# Patient Record
Sex: FEMALE | Race: ASIAN | NOT HISPANIC OR LATINO | URBAN - METROPOLITAN AREA
[De-identification: names, ages, dates, MRNs, and addresses within clinical notes are randomized per-mention and may not be internally consistent; named-entity substitution may affect disease eponyms.]

---

## 2023-11-18 ENCOUNTER — INPATIENT (INPATIENT)
Facility: HOSPITAL | Age: 28
LOS: 2 days | Discharge: HOME | End: 2023-11-21
Attending: OBSTETRICS & GYNECOLOGY | Admitting: OBSTETRICS & GYNECOLOGY
Payer: MEDICAID

## 2023-11-18 VITALS — HEART RATE: 81 BPM | SYSTOLIC BLOOD PRESSURE: 135 MMHG | DIASTOLIC BLOOD PRESSURE: 90 MMHG

## 2023-11-18 DIAGNOSIS — Z34.80 ENCOUNTER FOR SUPERVISION OF OTHER NORMAL PREGNANCY, UNSPECIFIED TRIMESTER: ICD-10-CM

## 2023-11-18 DIAGNOSIS — O26.899 OTHER SPECIFIED PREGNANCY RELATED CONDITIONS, UNSPECIFIED TRIMESTER: ICD-10-CM

## 2023-11-18 LAB
BASOPHILS # BLD AUTO: 0.03 K/UL — SIGNIFICANT CHANGE UP (ref 0–0.2)
BASOPHILS # BLD AUTO: 0.03 K/UL — SIGNIFICANT CHANGE UP (ref 0–0.2)
BASOPHILS NFR BLD AUTO: 0.3 % — SIGNIFICANT CHANGE UP (ref 0–2)
BASOPHILS NFR BLD AUTO: 0.3 % — SIGNIFICANT CHANGE UP (ref 0–2)
BLD GP AB SCN SERPL QL: NEGATIVE — SIGNIFICANT CHANGE UP
BLD GP AB SCN SERPL QL: NEGATIVE — SIGNIFICANT CHANGE UP
EOSINOPHIL # BLD AUTO: 0.3 K/UL — SIGNIFICANT CHANGE UP (ref 0–0.5)
EOSINOPHIL # BLD AUTO: 0.3 K/UL — SIGNIFICANT CHANGE UP (ref 0–0.5)
EOSINOPHIL NFR BLD AUTO: 2.9 % — SIGNIFICANT CHANGE UP (ref 0–6)
EOSINOPHIL NFR BLD AUTO: 2.9 % — SIGNIFICANT CHANGE UP (ref 0–6)
HCT VFR BLD CALC: 33.1 % — LOW (ref 34.5–45)
HCT VFR BLD CALC: 33.1 % — LOW (ref 34.5–45)
HGB BLD-MCNC: 10.3 G/DL — LOW (ref 11.5–15.5)
HGB BLD-MCNC: 10.3 G/DL — LOW (ref 11.5–15.5)
IMM GRANULOCYTES NFR BLD AUTO: 0.4 % — SIGNIFICANT CHANGE UP (ref 0–0.9)
IMM GRANULOCYTES NFR BLD AUTO: 0.4 % — SIGNIFICANT CHANGE UP (ref 0–0.9)
LYMPHOCYTES # BLD AUTO: 1.52 K/UL — SIGNIFICANT CHANGE UP (ref 1–3.3)
LYMPHOCYTES # BLD AUTO: 1.52 K/UL — SIGNIFICANT CHANGE UP (ref 1–3.3)
LYMPHOCYTES # BLD AUTO: 14.5 % — SIGNIFICANT CHANGE UP (ref 13–44)
LYMPHOCYTES # BLD AUTO: 14.5 % — SIGNIFICANT CHANGE UP (ref 13–44)
MCHC RBC-ENTMCNC: 24.9 PG — LOW (ref 27–34)
MCHC RBC-ENTMCNC: 24.9 PG — LOW (ref 27–34)
MCHC RBC-ENTMCNC: 31.1 GM/DL — LOW (ref 32–36)
MCHC RBC-ENTMCNC: 31.1 GM/DL — LOW (ref 32–36)
MCV RBC AUTO: 80 FL — SIGNIFICANT CHANGE UP (ref 80–100)
MCV RBC AUTO: 80 FL — SIGNIFICANT CHANGE UP (ref 80–100)
MONOCYTES # BLD AUTO: 0.55 K/UL — SIGNIFICANT CHANGE UP (ref 0–0.9)
MONOCYTES # BLD AUTO: 0.55 K/UL — SIGNIFICANT CHANGE UP (ref 0–0.9)
MONOCYTES NFR BLD AUTO: 5.3 % — SIGNIFICANT CHANGE UP (ref 2–14)
MONOCYTES NFR BLD AUTO: 5.3 % — SIGNIFICANT CHANGE UP (ref 2–14)
NEUTROPHILS # BLD AUTO: 8.01 K/UL — HIGH (ref 1.8–7.4)
NEUTROPHILS # BLD AUTO: 8.01 K/UL — HIGH (ref 1.8–7.4)
NEUTROPHILS NFR BLD AUTO: 76.6 % — SIGNIFICANT CHANGE UP (ref 43–77)
NEUTROPHILS NFR BLD AUTO: 76.6 % — SIGNIFICANT CHANGE UP (ref 43–77)
NRBC # BLD: 0 /100 WBCS — SIGNIFICANT CHANGE UP (ref 0–0)
NRBC # BLD: 0 /100 WBCS — SIGNIFICANT CHANGE UP (ref 0–0)
PLATELET # BLD AUTO: 258 K/UL — SIGNIFICANT CHANGE UP (ref 150–400)
PLATELET # BLD AUTO: 258 K/UL — SIGNIFICANT CHANGE UP (ref 150–400)
RBC # BLD: 4.14 M/UL — SIGNIFICANT CHANGE UP (ref 3.8–5.2)
RBC # BLD: 4.14 M/UL — SIGNIFICANT CHANGE UP (ref 3.8–5.2)
RBC # FLD: 15.6 % — HIGH (ref 10.3–14.5)
RBC # FLD: 15.6 % — HIGH (ref 10.3–14.5)
RH IG SCN BLD-IMP: POSITIVE — SIGNIFICANT CHANGE UP
RH IG SCN BLD-IMP: POSITIVE — SIGNIFICANT CHANGE UP
T PALLIDUM AB TITR SER: NEGATIVE — SIGNIFICANT CHANGE UP
T PALLIDUM AB TITR SER: NEGATIVE — SIGNIFICANT CHANGE UP
WBC # BLD: 10.45 K/UL — SIGNIFICANT CHANGE UP (ref 3.8–10.5)
WBC # BLD: 10.45 K/UL — SIGNIFICANT CHANGE UP (ref 3.8–10.5)
WBC # FLD AUTO: 10.45 K/UL — SIGNIFICANT CHANGE UP (ref 3.8–10.5)
WBC # FLD AUTO: 10.45 K/UL — SIGNIFICANT CHANGE UP (ref 3.8–10.5)

## 2023-11-18 RX ORDER — CITRIC ACID/SODIUM CITRATE 300-500 MG
15 SOLUTION, ORAL ORAL EVERY 6 HOURS
Refills: 0 | Status: DISCONTINUED | OUTPATIENT
Start: 2023-11-18 | End: 2023-11-19

## 2023-11-18 RX ORDER — ACETAMINOPHEN 500 MG
975 TABLET ORAL
Refills: 0 | Status: DISCONTINUED | OUTPATIENT
Start: 2023-11-18 | End: 2023-11-21

## 2023-11-18 RX ORDER — SIMETHICONE 80 MG/1
80 TABLET, CHEWABLE ORAL EVERY 4 HOURS
Refills: 0 | Status: DISCONTINUED | OUTPATIENT
Start: 2023-11-18 | End: 2023-11-21

## 2023-11-18 RX ORDER — MORPHINE SULFATE 50 MG/1
2 CAPSULE, EXTENDED RELEASE ORAL ONCE
Refills: 0 | Status: DISCONTINUED | OUTPATIENT
Start: 2023-11-18 | End: 2023-11-19

## 2023-11-18 RX ORDER — CHLORHEXIDINE GLUCONATE 213 G/1000ML
1 SOLUTION TOPICAL DAILY
Refills: 0 | Status: DISCONTINUED | OUTPATIENT
Start: 2023-11-18 | End: 2023-11-19

## 2023-11-18 RX ORDER — OXYCODONE HYDROCHLORIDE 5 MG/1
5 TABLET ORAL
Refills: 0 | Status: DISCONTINUED | OUTPATIENT
Start: 2023-11-18 | End: 2023-11-21

## 2023-11-18 RX ORDER — NALOXONE HYDROCHLORIDE 4 MG/.1ML
0.1 SPRAY NASAL
Refills: 0 | Status: DISCONTINUED | OUTPATIENT
Start: 2023-11-18 | End: 2023-11-19

## 2023-11-18 RX ORDER — LANOLIN
1 OINTMENT (GRAM) TOPICAL EVERY 6 HOURS
Refills: 0 | Status: DISCONTINUED | OUTPATIENT
Start: 2023-11-18 | End: 2023-11-21

## 2023-11-18 RX ORDER — OXYTOCIN 10 UNIT/ML
333.33 VIAL (ML) INJECTION
Qty: 20 | Refills: 0 | Status: DISCONTINUED | OUTPATIENT
Start: 2023-11-18 | End: 2023-11-21

## 2023-11-18 RX ORDER — IBUPROFEN 200 MG
600 TABLET ORAL EVERY 6 HOURS
Refills: 0 | Status: COMPLETED | OUTPATIENT
Start: 2023-11-18 | End: 2024-10-16

## 2023-11-18 RX ORDER — DIPHENHYDRAMINE HCL 50 MG
25 CAPSULE ORAL EVERY 6 HOURS
Refills: 0 | Status: DISCONTINUED | OUTPATIENT
Start: 2023-11-18 | End: 2023-11-21

## 2023-11-18 RX ORDER — OXYTOCIN 10 UNIT/ML
VIAL (ML) INJECTION
Qty: 30 | Refills: 0 | Status: DISCONTINUED | OUTPATIENT
Start: 2023-11-18 | End: 2023-11-19

## 2023-11-18 RX ORDER — OXYCODONE HYDROCHLORIDE 5 MG/1
5 TABLET ORAL ONCE
Refills: 0 | Status: DISCONTINUED | OUTPATIENT
Start: 2023-11-18 | End: 2023-11-21

## 2023-11-18 RX ORDER — SODIUM CHLORIDE 9 MG/ML
1000 INJECTION, SOLUTION INTRAVENOUS
Refills: 0 | Status: DISCONTINUED | OUTPATIENT
Start: 2023-11-18 | End: 2023-11-21

## 2023-11-18 RX ORDER — TETANUS TOXOID, REDUCED DIPHTHERIA TOXOID AND ACELLULAR PERTUSSIS VACCINE, ADSORBED 5; 2.5; 8; 8; 2.5 [IU]/.5ML; [IU]/.5ML; UG/.5ML; UG/.5ML; UG/.5ML
0.5 SUSPENSION INTRAMUSCULAR ONCE
Refills: 0 | Status: DISCONTINUED | OUTPATIENT
Start: 2023-11-18 | End: 2023-11-21

## 2023-11-18 RX ORDER — DEXAMETHASONE 0.5 MG/5ML
4 ELIXIR ORAL EVERY 6 HOURS
Refills: 0 | Status: DISCONTINUED | OUTPATIENT
Start: 2023-11-18 | End: 2023-11-19

## 2023-11-18 RX ORDER — SODIUM CHLORIDE 9 MG/ML
1000 INJECTION, SOLUTION INTRAVENOUS
Refills: 0 | Status: DISCONTINUED | OUTPATIENT
Start: 2023-11-18 | End: 2023-11-19

## 2023-11-18 RX ORDER — MAGNESIUM HYDROXIDE 400 MG/1
30 TABLET, CHEWABLE ORAL
Refills: 0 | Status: DISCONTINUED | OUTPATIENT
Start: 2023-11-18 | End: 2023-11-21

## 2023-11-18 RX ORDER — KETOROLAC TROMETHAMINE 30 MG/ML
30 SYRINGE (ML) INJECTION EVERY 6 HOURS
Refills: 0 | Status: DISCONTINUED | OUTPATIENT
Start: 2023-11-18 | End: 2023-11-19

## 2023-11-18 RX ORDER — ONDANSETRON 8 MG/1
4 TABLET, FILM COATED ORAL EVERY 6 HOURS
Refills: 0 | Status: DISCONTINUED | OUTPATIENT
Start: 2023-11-18 | End: 2023-11-19

## 2023-11-18 RX ADMIN — SODIUM CHLORIDE 125 MILLILITER(S): 9 INJECTION, SOLUTION INTRAVENOUS at 06:27

## 2023-11-18 RX ADMIN — Medication 2 MILLIUNIT(S)/MIN: at 10:33

## 2023-11-18 NOTE — OB PROVIDER H&P - NSLOWPPHRISK_OBGYN_A_OB
No previous uterine incision/Sagastume Pregnancy/Less than or equal to 4 previous vaginal births/No known bleeding disorder/No history of postpartum hemorrhage/No other PPH risks indicated

## 2023-11-18 NOTE — OB NEONATOLOGY/PEDIATRICIAN DELIVERY SUMMARY - NSPEDSNEONOTESA_OBGYN_ALL_OB_FT
Peds NP requested to attend delivery due to MSAF and Cat II NRFHT for this 41wk AGA male born on 23 at 2101 via primary unscheduled CS IOL due to postdate to a 26 y/o  blood type O+ mother.  No significant maternal or prenatal history.  PNL HIV -/Hep B-/RPR non-reactive/Rubella immune, GBS - on 10/17/23.  AROM at 1355 with light meconium fluids.  Baby emerged vigorous, crying, was warmed/ dried/ suctioned/ stimulated with APGARS of 9/9.  Mom plans to initiate breastfeeding, consents to Hep B vaccine, and consents to circ.  Highest maternal temp 37.5C with EOS of 0.32.  Admitted under Dr. Winkler.

## 2023-11-18 NOTE — OB PROVIDER LABOR PROGRESS NOTE - ASSESSMENT
Stable fetal status  Instructed in pushing  Reviewed poss FHRT changes in 2nd stage,  poss indication for intervention, pros/cons VAVD vs LFD vs C/S all reviewed.  Pt expressed understanding.  DIANE Sweet
Stable fetal status  Rebolus epidural  Cont labor
A/P: 28y/o  @41w IOL for LT  - Labor: 22@830a  - Fetus: cat 1  - GBS: negative  - Pain: minimal    Patient making good change, frida every 7-10 minutes. Will switch to pitocin.     Nay Chery, PGY1  d/w Dr. Sweet  
Stable fetal status  Clinically adeq pelvis  Clinical efw 8 lbs  AROM light mec  Plan cont Pit and monitor progress.  DIANE Sweet
Stable fetal status  Cont labor
A/P: 28y/o P0 @41w   - Labor: Pit@1030, paused   - Fetus: FHR decel -> return to category 1  - GBS: negative  - Pain: epidural, well controlled.      Patient with FHR deceleration seen and assessed at bedside. Patient found to have tonic contraction. Repositioned, pitocin paused. Deceleration resolved with repositioning and FHR stable category 1.     Nay Chery, PGY1    d/w Dr. Sweet    
A/P: 28y/o  @41w IOL for LT  Pitocin off  If tracing okay in next 20-30 min will restart Pitocin  Dr Sweet aware  Stephanie Aguilar PAC

## 2023-11-18 NOTE — OB PROVIDER DELIVERY SUMMARY - NSSELHIDDEN_OBGYN_ALL_OB_FT
[NS_DeliveryAttending1_OBGYN_ALL_OB_FT:[id number]=],[NS_DeliveryRN_OBGYN_ALL_OB_FT:[id number]=]

## 2023-11-18 NOTE — OB PROVIDER LABOR PROGRESS NOTE - NSVAGINALEXAM_OBGYN_ALL_OB_DT
18-Nov-2023 15:31
18-Nov-2023 18:22
18-Nov-2023 17:20
18-Nov-2023 12:22
18-Nov-2023 16:20
18-Nov-2023 10:02
18-Nov-2023 13:54
Statement Selected

## 2023-11-18 NOTE — OB RN PATIENT PROFILE - FALL HARM RISK - UNIVERSAL INTERVENTIONS
Bed in lowest position, wheels locked, appropriate side rails in place/Call bell, personal items and telephone in reach/Instruct patient to call for assistance before getting out of bed or chair/Non-slip footwear when patient is out of bed/[place] to call system/Physically safe environment - no spills, clutter or unnecessary equipment/Purposeful Proactive Rounding/Room/bathroom lighting operational, light cord in reach

## 2023-11-18 NOTE — OB RN DELIVERY SUMMARY - NS_SEPSISRSKCALC_OBGYN_ALL_OB_FT
EOS calculated successfully. EOS Risk Factor: 0.5/1000 live births ([facility] national incidence); GA=41w;Temp=99.5; ROM=7.1; GBS='Negative'; Antibiotics='Broad spectrum antibiotics 2-3.9 hrs prior to birth'

## 2023-11-18 NOTE — PRE-ANESTHESIA EVALUATION ADULT - NSANTHOSAYNRD_GEN_A_CORE
No. ALIN screening performed.  STOP BANG Legend: 0-2 = LOW Risk; 3-4 = INTERMEDIATE Risk; 5-8 = HIGH Risk

## 2023-11-18 NOTE — OB RN DELIVERY SUMMARY - NS_LABORCHARACTER_OBGYN_ALL_OB
Induction of labor-AROM/Induction of labor-Medicinal/Internal electronic FM/External electronic FM/Meconium staining/Fetal intolerance

## 2023-11-18 NOTE — OB RN INTRAOPERATIVE NOTE - NSSELHIDDEN_OBGYN_ALL_OB_FT
[NS_DeliveryAttending1_OBGYN_ALL_OB_FT:[id number]=],[NS_DeliveryRN_OBGYN_ALL_OB_FT:[id number]=]

## 2023-11-18 NOTE — OB PROVIDER LABOR PROGRESS NOTE - NS_SUBJECTIVE/OBJECTIVE_OBGYN_ALL_OB_FT
Att  28 yo P0 at 41wks, adm early this am in early labor after having ctx's all yest afternoon and evening.  Uncomplicated APC, no sig PMHs, GBS neg.  Was started on oral cytotec when ctx's spaced out, then switched to Pit, now comfortable with epidural.
Evaluated again during a push.  Vtx will descend further, feels to be ROT to MURALI.  Pros/cons attempting LFD reviewed and I would recommend proceed to C/S and pt agrees.  Staff will open an OR.  DIANE Sweet
Pt started pushing and decel noted.  Good recovery.  FSE applied.  Will monitor for recovery and then resume pushing.  DIANE Sweet
Pushing with pt.   She is pushing well.  Caput at +4, vtx at +3.  D/w pt, worsening decels on FHRT, will recommend intervention if they persist.  If vtx descends further will try LFD, if not will proceed to C/S.  DIANE Sweet
Resumed pushing around 715pm (due to nursing change of shift)  Pushing well now for about 30m.  Vtx remains at +3 station.  FHRT showed variables decels.  Encouraged pt to cont pushing.  If vtx descends further and FHRT cont to show decels will eval for LFD.  DIANE Sweet
Earlier had a decel, recovered well, Pit was shut off, now back on at 4 mu/min.  Pt now more uncomfortable.
R1 OB Labor Note    S: Patient seen and examined at bedside.     T(C): 36.8 (11-18-23 @ 08:49), Max: 36.8 (11-18-23 @ 04:19)  HR: 84 (11-18-23 @ 09:55) (71 - 99)  BP: 120/78 (11-18-23 @ 08:49) (117/73 - 136/89)  BP(mean): --  ABP: --  ABP(mean): --  RR: 16 (11-18-23 @ 08:49) (16 - 18)  SpO2: 96% (11-18-23 @ 09:55) (93% - 98%)  Wt(kg): --  CVP(mm Hg): --  CI: --  CAPILLARY BLOOD GLUCOSE       N/A      11-18 @ 07:01  -  11-18 @ 10:01  --------------------------------------------------------  IN:  Total IN: 0 mL    OUT:    Voided (mL): 200 mL  Total OUT: 200 mL    Total NET: -200 mL
Pt seen for 2 minute decel. Pt feeling well.  T(C): 36.8 (11-18-23 @ 10:40), Max: 36.8 (11-18-23 @ 04:19)  HR: 79 (11-18-23 @ 12:20) (71 - 112)  BP: 109/72 (11-18-23 @ 12:20) (102/67 - 136/89)  RR: 16 (11-18-23 @ 10:40) (16 - 18)  SpO2: 94% (11-18-23 @ 12:20) (93% - 98%)
Comfortable, feels rectal pressure
Pain better, feeling pressure
R1 OB Labor Note    S: Patient seen and examined at bedside.     T(C): 36.8 (11-18-23 @ 15:13), Max: 36.8 (11-18-23 @ 04:19)  HR: 90 (11-18-23 @ 15:29) (71 - 112)  BP: 120/81 (11-18-23 @ 15:20) (102/67 - 136/89)  BP(mean): --  ABP: --  ABP(mean): --  RR: 18 (11-18-23 @ 15:13) (16 - 18)  SpO2: 96% (11-18-23 @ 15:29) (92% - 98%)  Wt(kg): --  CVP(mm Hg): --  CI: --  CAPILLARY BLOOD GLUCOSE       N/A      11-18 @ 07:01  -  11-18 @ 15:31  --------------------------------------------------------  IN:    dextrose 5% + lactated ringers: 1000 mL  Total IN: 1000 mL    OUT:    Voided (mL): 500 mL  Total OUT: 500 mL    Total NET: 500 mL

## 2023-11-18 NOTE — OB RN PATIENT PROFILE - HISTORY OF COVID-19 VACCINATION
"Nina Samuelaustin Rodriguez was seen and treated in our emergency department on 10/8/2023.  She should be cleared by a physician before returning to gym class or sports on 10/23/2023.  Can not participate in any physical activities where reaching over head or use of the upper extremities is needed.     If you have any questions or concerns, please don't hesitate to call.      Isra Mack MD"
Yes

## 2023-11-18 NOTE — OB PROVIDER H&P - NSHPPHYSICALEXAM_GEN_ALL_CORE
CV: S1,S2  Pulmonary: Clear to auscultation bilaterally    Abdomen: Gravid abdomen  Extremities: Nontender to palpation bilaterally     EFM: 120 hr, moderate variability, +accelerations, -decelerations   TOCO: Contractions [id number]   SVE: 1/50/-3  BSUS: Vertex

## 2023-11-18 NOTE — OB PROVIDER DELIVERY SUMMARY - NSPROVIDERDELIVERYNOTE_OBGYN_ALL_OB_FT
Primary LTCS, uncomplicated for arrest of descent at fully dilated. Fetus found to be ROT.  Viable male infant, vertex presentation, Apgars 9/9, cord gasses sent  Grossly normal fallopian tubes, uterus, and ovaries  Uterus closed in 1 locked running layer with PDS  Muscle reapproximated with 1 vicryl  Fascia closed with 0 vicryl  Skin closed with insorb    EBL: 868  IVF: 1500  UOP: 200    Dr. Jiang () assisted with upward manual elevation of fetal head.    NOEL Boudreaux, PGY3  w/ Dr. Sweet Primary LTCS, uncomplicated for arrest of descent at fully dilated. Fetus found to be ROT.  Viable male infant, vertex presentation, Apgars 9/9, cord gasses sent  Grossly normal fallopian tubes, uterus, and ovaries  Uterus closed in 1 locked running layer with PDS  Muscle reapproximated with 1 vicryl  Fascia closed with 0 vicryl  Skin closed with insorb    EBL: 868  IVF: 1500  UOP: 200  Dictation#[id number]    Dr. Jiang () assisted with upward manual elevation of fetal head.    NOEL Boudreaux, PGY3  w/ Dr. Sweet Primary LTCS, uncomplicated for arrest of descent and NRFHRT at fully dilated. Fetus found to be ROT.  Viable male infant, vertex presentation, Apgars 9/9, cord gasses sent  Grossly normal fallopian tubes, uterus, and ovaries  Uterus closed in 1 locked running layer with PDS  Muscle reapproximated with 0 vicryl  Fascia closed with 1 vicryl  Skin closed with insorb    EBL: 868  IVF: 1500  UOP: 200  Dictation#[id number]    Dr. Jiang () assisted with upward manual elevation of fetal head.    NOEL Boudreaux, PGY3  w/ Dr. Sweet

## 2023-11-18 NOTE — OB PROVIDER H&P - HISTORY OF PRESENT ILLNESS
26 yo  @41 weeks (ABHI: 23) presents with painful contractions q4 minutes. Patient admits to good fetal movement, and denies vaginal bleeding or loss of fluids at this time.    GBS: Negative  EFW: 3200  OB: Primigravida   GYN: Denies history of fibroids, abnormal pap smears, STDs, or ovarian cysts   Allergies: NKDA   Medical Hx: Denies history of HTN, DM, bleeding disorders, thyroid disorders   Medications: Prenatal vitamins   Surgical Hx: Denies   Social Hx: Denies x3, no anxiety or depression

## 2023-11-18 NOTE — OB PROVIDER H&P - ASSESSMENT
A/P: 28 yo  @41 weeks (ABHI:23) admitted in labor.   Plan   - Admit to L&D  - Routine labs, IVF, NPO  - EFM: 120hr, moderate variability, +accelerations, -decelerations   - GBS: Negative  - EFW: 3200  - Expectant management  - Epidural PRN  - Discussed with Dr. Cuevas

## 2023-11-18 NOTE — OB PROVIDER IHI INDUCTION/AUGMENTATION NOTE - NS_OBIHIADDITIONDETAILS_OBGYN_ALL_OB_FT
26 yo  @41 weeks (ABHI:23) admitted in labor.   Plan:  PO cytotec  Anticipate  26 yo  @41 weeks (ABHI:23) admitted in labor.   Plan:  -Continue to monitor EFM/ TOCO   -PO cytotec  Anticipate

## 2023-11-18 NOTE — OB PROVIDER LABOR PROGRESS NOTE - NS_OBIHIFHRDETAILS_OBGYN_ALL_OB_FT
140 + accels occ early type decels mod variab
140 + accels no decels mod variab
120/mod/+accels/2 min prolonged decel to davon 70bpm->recovered with Pitocin off and repositioning, cat 2 FHT
120/mod/+accels/-decels
140 + accels no decels mod variab
5 minute deceleration -> return to baseline at 120/mod/+accels/-decels
140 + accels no decels mod variab

## 2023-11-18 NOTE — OB RN DELIVERY SUMMARY - NSSELHIDDEN_OBGYN_ALL_OB_FT
[NS_DeliveryAttending1_OBGYN_ALL_OB_FT:[id number]=],[NS_DeliveryRN_OBGYN_ALL_OB_FT:[id number]=]

## 2023-11-18 NOTE — OB PROVIDER H&P - ANESTHESIA, PREVIOUS REACTION, PROFILE
none

## 2023-11-18 NOTE — OB PROVIDER H&P - NSTRANFUSIONOBJECTION_GEN_ALL_CORE_SIUH
February 5, 2019      Patience Norton MD  [address]  [address]  [facility]  [facility] [address]           [facility]  [address] [address] [place] [address]  Phone: [phone]  Fax: [phone]          Patient: Cee Mora   MR Number: [id number]   YOB: 1963   Date of Visit: 2/5/2019       Dear Dr. Patience Norton:    Thank you for referring Cee Mora to me for evaluation. Attached you will find relevant portions of my assessment and plan of care.    If you have questions, please do not hesitate to call me. I look forward to following Cee Mora along with you.    Sincerely,    Desean Suero MD    Enclosure  CC:  No Recipients    If you would like to receive this communication electronically, please contact [email or web address] or [phone] to request more information on [employer] access.    For providers and/or their staff who would like to refer a patient to [facility], please contact us through our one-stop-shop provider referral line, [facility], at [phone].    If you feel you have received this communication in error or would no longer like to receive these types of communications, please e-mail [email or web address]          Patient has no objection to blood transfusions.

## 2023-11-19 DIAGNOSIS — D62 ACUTE POSTHEMORRHAGIC ANEMIA: ICD-10-CM

## 2023-11-19 LAB
BASOPHILS # BLD AUTO: 0.02 K/UL — SIGNIFICANT CHANGE UP (ref 0–0.2)
BASOPHILS # BLD AUTO: 0.02 K/UL — SIGNIFICANT CHANGE UP (ref 0–0.2)
BASOPHILS NFR BLD AUTO: 0.1 % — SIGNIFICANT CHANGE UP (ref 0–2)
BASOPHILS NFR BLD AUTO: 0.1 % — SIGNIFICANT CHANGE UP (ref 0–2)
EOSINOPHIL # BLD AUTO: 0 K/UL — SIGNIFICANT CHANGE UP (ref 0–0.5)
EOSINOPHIL # BLD AUTO: 0 K/UL — SIGNIFICANT CHANGE UP (ref 0–0.5)
EOSINOPHIL NFR BLD AUTO: 0 % — SIGNIFICANT CHANGE UP (ref 0–6)
EOSINOPHIL NFR BLD AUTO: 0 % — SIGNIFICANT CHANGE UP (ref 0–6)
HCT VFR BLD CALC: 23.6 % — LOW (ref 34.5–45)
HCT VFR BLD CALC: 23.6 % — LOW (ref 34.5–45)
HCT VFR BLD CALC: 25.8 % — LOW (ref 34.5–45)
HCT VFR BLD CALC: 25.8 % — LOW (ref 34.5–45)
HGB BLD-MCNC: 7.4 G/DL — LOW (ref 11.5–15.5)
HGB BLD-MCNC: 7.4 G/DL — LOW (ref 11.5–15.5)
HGB BLD-MCNC: 8.2 G/DL — LOW (ref 11.5–15.5)
HGB BLD-MCNC: 8.2 G/DL — LOW (ref 11.5–15.5)
IMM GRANULOCYTES NFR BLD AUTO: 0.7 % — SIGNIFICANT CHANGE UP (ref 0–0.9)
IMM GRANULOCYTES NFR BLD AUTO: 0.7 % — SIGNIFICANT CHANGE UP (ref 0–0.9)
LYMPHOCYTES # BLD AUTO: 0.41 K/UL — LOW (ref 1–3.3)
LYMPHOCYTES # BLD AUTO: 0.41 K/UL — LOW (ref 1–3.3)
LYMPHOCYTES # BLD AUTO: 2 % — LOW (ref 13–44)
LYMPHOCYTES # BLD AUTO: 2 % — LOW (ref 13–44)
MCHC RBC-ENTMCNC: 24.9 PG — LOW (ref 27–34)
MCHC RBC-ENTMCNC: 24.9 PG — LOW (ref 27–34)
MCHC RBC-ENTMCNC: 25.2 PG — LOW (ref 27–34)
MCHC RBC-ENTMCNC: 25.2 PG — LOW (ref 27–34)
MCHC RBC-ENTMCNC: 31.4 GM/DL — LOW (ref 32–36)
MCHC RBC-ENTMCNC: 31.4 GM/DL — LOW (ref 32–36)
MCHC RBC-ENTMCNC: 31.8 GM/DL — LOW (ref 32–36)
MCHC RBC-ENTMCNC: 31.8 GM/DL — LOW (ref 32–36)
MCV RBC AUTO: 79.1 FL — LOW (ref 80–100)
MCV RBC AUTO: 79.1 FL — LOW (ref 80–100)
MCV RBC AUTO: 79.5 FL — LOW (ref 80–100)
MCV RBC AUTO: 79.5 FL — LOW (ref 80–100)
MONOCYTES # BLD AUTO: 0.56 K/UL — SIGNIFICANT CHANGE UP (ref 0–0.9)
MONOCYTES # BLD AUTO: 0.56 K/UL — SIGNIFICANT CHANGE UP (ref 0–0.9)
MONOCYTES NFR BLD AUTO: 2.7 % — SIGNIFICANT CHANGE UP (ref 2–14)
MONOCYTES NFR BLD AUTO: 2.7 % — SIGNIFICANT CHANGE UP (ref 2–14)
NEUTROPHILS # BLD AUTO: 19.63 K/UL — HIGH (ref 1.8–7.4)
NEUTROPHILS # BLD AUTO: 19.63 K/UL — HIGH (ref 1.8–7.4)
NEUTROPHILS NFR BLD AUTO: 94.5 % — HIGH (ref 43–77)
NEUTROPHILS NFR BLD AUTO: 94.5 % — HIGH (ref 43–77)
NRBC # BLD: 0 /100 WBCS — SIGNIFICANT CHANGE UP (ref 0–0)
PLATELET # BLD AUTO: 212 K/UL — SIGNIFICANT CHANGE UP (ref 150–400)
PLATELET # BLD AUTO: 212 K/UL — SIGNIFICANT CHANGE UP (ref 150–400)
PLATELET # BLD AUTO: 224 K/UL — SIGNIFICANT CHANGE UP (ref 150–400)
PLATELET # BLD AUTO: 224 K/UL — SIGNIFICANT CHANGE UP (ref 150–400)
RBC # BLD: 2.97 M/UL — LOW (ref 3.8–5.2)
RBC # BLD: 2.97 M/UL — LOW (ref 3.8–5.2)
RBC # BLD: 3.26 M/UL — LOW (ref 3.8–5.2)
RBC # BLD: 3.26 M/UL — LOW (ref 3.8–5.2)
RBC # FLD: 15.7 % — HIGH (ref 10.3–14.5)
RBC # FLD: 15.7 % — HIGH (ref 10.3–14.5)
RBC # FLD: 15.8 % — HIGH (ref 10.3–14.5)
RBC # FLD: 15.8 % — HIGH (ref 10.3–14.5)
WBC # BLD: 17.94 K/UL — HIGH (ref 3.8–10.5)
WBC # BLD: 17.94 K/UL — HIGH (ref 3.8–10.5)
WBC # BLD: 20.76 K/UL — HIGH (ref 3.8–10.5)
WBC # BLD: 20.76 K/UL — HIGH (ref 3.8–10.5)
WBC # FLD AUTO: 17.94 K/UL — HIGH (ref 3.8–10.5)
WBC # FLD AUTO: 17.94 K/UL — HIGH (ref 3.8–10.5)
WBC # FLD AUTO: 20.76 K/UL — HIGH (ref 3.8–10.5)
WBC # FLD AUTO: 20.76 K/UL — HIGH (ref 3.8–10.5)

## 2023-11-19 RX ORDER — ASCORBIC ACID 60 MG
500 TABLET,CHEWABLE ORAL THREE TIMES A DAY
Refills: 0 | Status: DISCONTINUED | OUTPATIENT
Start: 2023-11-19 | End: 2023-11-21

## 2023-11-19 RX ORDER — IBUPROFEN 200 MG
600 TABLET ORAL EVERY 6 HOURS
Refills: 0 | Status: DISCONTINUED | OUTPATIENT
Start: 2023-11-19 | End: 2023-11-21

## 2023-11-19 RX ORDER — FERROUS SULFATE 325(65) MG
325 TABLET ORAL THREE TIMES A DAY
Refills: 0 | Status: DISCONTINUED | OUTPATIENT
Start: 2023-11-19 | End: 2023-11-21

## 2023-11-19 RX ORDER — HEPARIN SODIUM 5000 [USP'U]/ML
5000 INJECTION INTRAVENOUS; SUBCUTANEOUS EVERY 12 HOURS
Refills: 0 | Status: DISCONTINUED | OUTPATIENT
Start: 2023-11-19 | End: 2023-11-21

## 2023-11-19 RX ADMIN — Medication 4 MILLIGRAM(S): at 03:19

## 2023-11-19 RX ADMIN — Medication 30 MILLIGRAM(S): at 22:45

## 2023-11-19 RX ADMIN — Medication 30 MILLIGRAM(S): at 16:02

## 2023-11-19 RX ADMIN — HEPARIN SODIUM 5000 UNIT(S): 5000 INJECTION INTRAVENOUS; SUBCUTANEOUS at 18:10

## 2023-11-19 RX ADMIN — Medication 30 MILLIGRAM(S): at 21:47

## 2023-11-19 RX ADMIN — Medication 975 MILLIGRAM(S): at 18:10

## 2023-11-19 RX ADMIN — Medication 30 MILLIGRAM(S): at 09:25

## 2023-11-19 RX ADMIN — Medication 30 MILLIGRAM(S): at 02:44

## 2023-11-19 RX ADMIN — Medication 30 MILLIGRAM(S): at 15:21

## 2023-11-19 RX ADMIN — Medication 500 MILLIGRAM(S): at 15:21

## 2023-11-19 RX ADMIN — Medication 500 MILLIGRAM(S): at 21:44

## 2023-11-19 RX ADMIN — Medication 975 MILLIGRAM(S): at 13:05

## 2023-11-19 RX ADMIN — HEPARIN SODIUM 5000 UNIT(S): 5000 INJECTION INTRAVENOUS; SUBCUTANEOUS at 06:23

## 2023-11-19 RX ADMIN — Medication 30 MILLIGRAM(S): at 03:20

## 2023-11-19 RX ADMIN — Medication 325 MILLIGRAM(S): at 15:21

## 2023-11-19 RX ADMIN — Medication 975 MILLIGRAM(S): at 06:23

## 2023-11-19 RX ADMIN — Medication 325 MILLIGRAM(S): at 21:44

## 2023-11-19 RX ADMIN — Medication 975 MILLIGRAM(S): at 12:09

## 2023-11-19 RX ADMIN — Medication 30 MILLIGRAM(S): at 10:25

## 2023-11-19 NOTE — PROGRESS NOTE ADULT - ATTENDING COMMENTS
Resting comfortably, still has Block  VSS afeb  Abd S NT ND FF lochia moderate  inc c/d/i  Stable fetal status  Rpt CBC  Incr OOB  DTV  G Sweet

## 2023-11-20 LAB
HCT VFR BLD CALC: 22 % — LOW (ref 34.5–45)
HCT VFR BLD CALC: 22 % — LOW (ref 34.5–45)
HCT VFR BLD CALC: 25.2 % — LOW (ref 34.5–45)
HCT VFR BLD CALC: 25.2 % — LOW (ref 34.5–45)
HGB BLD-MCNC: 6.9 G/DL — CRITICAL LOW (ref 11.5–15.5)
HGB BLD-MCNC: 6.9 G/DL — CRITICAL LOW (ref 11.5–15.5)
HGB BLD-MCNC: 8.2 G/DL — LOW (ref 11.5–15.5)
HGB BLD-MCNC: 8.2 G/DL — LOW (ref 11.5–15.5)
MCHC RBC-ENTMCNC: 25 PG — LOW (ref 27–34)
MCHC RBC-ENTMCNC: 25 PG — LOW (ref 27–34)
MCHC RBC-ENTMCNC: 25.9 PG — LOW (ref 27–34)
MCHC RBC-ENTMCNC: 25.9 PG — LOW (ref 27–34)
MCHC RBC-ENTMCNC: 31.4 GM/DL — LOW (ref 32–36)
MCHC RBC-ENTMCNC: 31.4 GM/DL — LOW (ref 32–36)
MCHC RBC-ENTMCNC: 32.5 GM/DL — SIGNIFICANT CHANGE UP (ref 32–36)
MCHC RBC-ENTMCNC: 32.5 GM/DL — SIGNIFICANT CHANGE UP (ref 32–36)
MCV RBC AUTO: 79.7 FL — LOW (ref 80–100)
NRBC # BLD: 0 /100 WBCS — SIGNIFICANT CHANGE UP (ref 0–0)
PLATELET # BLD AUTO: 219 K/UL — SIGNIFICANT CHANGE UP (ref 150–400)
PLATELET # BLD AUTO: 219 K/UL — SIGNIFICANT CHANGE UP (ref 150–400)
PLATELET # BLD AUTO: 248 K/UL — SIGNIFICANT CHANGE UP (ref 150–400)
PLATELET # BLD AUTO: 248 K/UL — SIGNIFICANT CHANGE UP (ref 150–400)
RBC # BLD: 2.76 M/UL — LOW (ref 3.8–5.2)
RBC # BLD: 2.76 M/UL — LOW (ref 3.8–5.2)
RBC # BLD: 3.16 M/UL — LOW (ref 3.8–5.2)
RBC # BLD: 3.16 M/UL — LOW (ref 3.8–5.2)
RBC # FLD: 15.9 % — HIGH (ref 10.3–14.5)
RBC # FLD: 15.9 % — HIGH (ref 10.3–14.5)
RBC # FLD: 16 % — HIGH (ref 10.3–14.5)
RBC # FLD: 16 % — HIGH (ref 10.3–14.5)
WBC # BLD: 14.09 K/UL — HIGH (ref 3.8–10.5)
WBC # BLD: 14.09 K/UL — HIGH (ref 3.8–10.5)
WBC # BLD: 14.37 K/UL — HIGH (ref 3.8–10.5)
WBC # BLD: 14.37 K/UL — HIGH (ref 3.8–10.5)
WBC # FLD AUTO: 14.09 K/UL — HIGH (ref 3.8–10.5)
WBC # FLD AUTO: 14.09 K/UL — HIGH (ref 3.8–10.5)
WBC # FLD AUTO: 14.37 K/UL — HIGH (ref 3.8–10.5)
WBC # FLD AUTO: 14.37 K/UL — HIGH (ref 3.8–10.5)

## 2023-11-20 RX ORDER — DIPHENHYDRAMINE HCL 50 MG
25 CAPSULE ORAL ONCE
Refills: 0 | Status: COMPLETED | OUTPATIENT
Start: 2023-11-20 | End: 2023-11-20

## 2023-11-20 RX ADMIN — Medication 600 MILLIGRAM(S): at 08:59

## 2023-11-20 RX ADMIN — Medication 25 MILLIGRAM(S): at 09:53

## 2023-11-20 RX ADMIN — Medication 975 MILLIGRAM(S): at 00:06

## 2023-11-20 RX ADMIN — Medication 975 MILLIGRAM(S): at 13:09

## 2023-11-20 RX ADMIN — Medication 975 MILLIGRAM(S): at 06:23

## 2023-11-20 RX ADMIN — Medication 600 MILLIGRAM(S): at 22:30

## 2023-11-20 RX ADMIN — Medication 975 MILLIGRAM(S): at 13:30

## 2023-11-20 RX ADMIN — Medication 975 MILLIGRAM(S): at 07:00

## 2023-11-20 RX ADMIN — HEPARIN SODIUM 5000 UNIT(S): 5000 INJECTION INTRAVENOUS; SUBCUTANEOUS at 06:25

## 2023-11-20 RX ADMIN — Medication 975 MILLIGRAM(S): at 23:51

## 2023-11-20 RX ADMIN — Medication 325 MILLIGRAM(S): at 06:24

## 2023-11-20 RX ADMIN — MAGNESIUM HYDROXIDE 30 MILLILITER(S): 400 TABLET, CHEWABLE ORAL at 18:02

## 2023-11-20 RX ADMIN — Medication 975 MILLIGRAM(S): at 18:34

## 2023-11-20 RX ADMIN — HEPARIN SODIUM 5000 UNIT(S): 5000 INJECTION INTRAVENOUS; SUBCUTANEOUS at 18:05

## 2023-11-20 RX ADMIN — Medication 500 MILLIGRAM(S): at 13:09

## 2023-11-20 RX ADMIN — Medication 600 MILLIGRAM(S): at 16:30

## 2023-11-20 RX ADMIN — Medication 325 MILLIGRAM(S): at 21:31

## 2023-11-20 RX ADMIN — Medication 600 MILLIGRAM(S): at 21:30

## 2023-11-20 RX ADMIN — Medication 500 MILLIGRAM(S): at 21:31

## 2023-11-20 RX ADMIN — Medication 975 MILLIGRAM(S): at 01:00

## 2023-11-20 RX ADMIN — Medication 600 MILLIGRAM(S): at 09:30

## 2023-11-20 RX ADMIN — Medication 975 MILLIGRAM(S): at 18:03

## 2023-11-20 RX ADMIN — Medication 500 MILLIGRAM(S): at 06:24

## 2023-11-20 RX ADMIN — Medication 600 MILLIGRAM(S): at 16:03

## 2023-11-20 RX ADMIN — Medication 325 MILLIGRAM(S): at 13:09

## 2023-11-20 NOTE — PROGRESS NOTE ADULT - ATTENDING COMMENTS
Patient seen and examined by me.   Denies CP/SOB/palpitations/dizziness, however appears pale  lochia wnl  h/h 6.9/22  recommend one unit prbcs, pt and  agree, cbc post transfusion and ramiro morning

## 2023-11-21 VITALS
HEART RATE: 82 BPM | TEMPERATURE: 98 F | OXYGEN SATURATION: 99 % | DIASTOLIC BLOOD PRESSURE: 85 MMHG | RESPIRATION RATE: 18 BRPM | SYSTOLIC BLOOD PRESSURE: 128 MMHG

## 2023-11-21 LAB
HCT VFR BLD CALC: 27.2 % — LOW (ref 34.5–45)
HCT VFR BLD CALC: 27.2 % — LOW (ref 34.5–45)
HGB BLD-MCNC: 8.8 G/DL — LOW (ref 11.5–15.5)
HGB BLD-MCNC: 8.8 G/DL — LOW (ref 11.5–15.5)
MCHC RBC-ENTMCNC: 26 PG — LOW (ref 27–34)
MCHC RBC-ENTMCNC: 26 PG — LOW (ref 27–34)
MCHC RBC-ENTMCNC: 32.4 GM/DL — SIGNIFICANT CHANGE UP (ref 32–36)
MCHC RBC-ENTMCNC: 32.4 GM/DL — SIGNIFICANT CHANGE UP (ref 32–36)
MCV RBC AUTO: 80.2 FL — SIGNIFICANT CHANGE UP (ref 80–100)
MCV RBC AUTO: 80.2 FL — SIGNIFICANT CHANGE UP (ref 80–100)
NRBC # BLD: 0 /100 WBCS — SIGNIFICANT CHANGE UP (ref 0–0)
NRBC # BLD: 0 /100 WBCS — SIGNIFICANT CHANGE UP (ref 0–0)
PLATELET # BLD AUTO: 262 K/UL — SIGNIFICANT CHANGE UP (ref 150–400)
PLATELET # BLD AUTO: 262 K/UL — SIGNIFICANT CHANGE UP (ref 150–400)
RBC # BLD: 3.39 M/UL — LOW (ref 3.8–5.2)
RBC # BLD: 3.39 M/UL — LOW (ref 3.8–5.2)
RBC # FLD: 16 % — HIGH (ref 10.3–14.5)
RBC # FLD: 16 % — HIGH (ref 10.3–14.5)
WBC # BLD: 12.5 K/UL — HIGH (ref 3.8–10.5)
WBC # BLD: 12.5 K/UL — HIGH (ref 3.8–10.5)
WBC # FLD AUTO: 12.5 K/UL — HIGH (ref 3.8–10.5)
WBC # FLD AUTO: 12.5 K/UL — HIGH (ref 3.8–10.5)

## 2023-11-21 PROCEDURE — 85025 COMPLETE CBC W/AUTO DIFF WBC: CPT

## 2023-11-21 PROCEDURE — 86850 RBC ANTIBODY SCREEN: CPT

## 2023-11-21 PROCEDURE — 86900 BLOOD TYPING SEROLOGIC ABO: CPT

## 2023-11-21 PROCEDURE — 86923 COMPATIBILITY TEST ELECTRIC: CPT

## 2023-11-21 PROCEDURE — P9016: CPT

## 2023-11-21 PROCEDURE — 86780 TREPONEMA PALLIDUM: CPT

## 2023-11-21 PROCEDURE — 36415 COLL VENOUS BLD VENIPUNCTURE: CPT

## 2023-11-21 PROCEDURE — 36430 TRANSFUSION BLD/BLD COMPNT: CPT

## 2023-11-21 PROCEDURE — 85027 COMPLETE CBC AUTOMATED: CPT

## 2023-11-21 PROCEDURE — 59050 FETAL MONITOR W/REPORT: CPT

## 2023-11-21 PROCEDURE — 86901 BLOOD TYPING SEROLOGIC RH(D): CPT

## 2023-11-21 PROCEDURE — 59025 FETAL NON-STRESS TEST: CPT

## 2023-11-21 RX ADMIN — Medication 975 MILLIGRAM(S): at 13:15

## 2023-11-21 RX ADMIN — Medication 500 MILLIGRAM(S): at 05:32

## 2023-11-21 RX ADMIN — Medication 975 MILLIGRAM(S): at 00:45

## 2023-11-21 RX ADMIN — Medication 325 MILLIGRAM(S): at 14:04

## 2023-11-21 RX ADMIN — SIMETHICONE 80 MILLIGRAM(S): 80 TABLET, CHEWABLE ORAL at 08:04

## 2023-11-21 RX ADMIN — Medication 500 MILLIGRAM(S): at 14:03

## 2023-11-21 RX ADMIN — Medication 975 MILLIGRAM(S): at 06:30

## 2023-11-21 RX ADMIN — Medication 600 MILLIGRAM(S): at 08:04

## 2023-11-21 RX ADMIN — Medication 325 MILLIGRAM(S): at 05:32

## 2023-11-21 RX ADMIN — Medication 600 MILLIGRAM(S): at 08:45

## 2023-11-21 RX ADMIN — Medication 600 MILLIGRAM(S): at 14:04

## 2023-11-21 RX ADMIN — Medication 600 MILLIGRAM(S): at 14:40

## 2023-11-21 RX ADMIN — HEPARIN SODIUM 5000 UNIT(S): 5000 INJECTION INTRAVENOUS; SUBCUTANEOUS at 05:33

## 2023-11-21 RX ADMIN — Medication 975 MILLIGRAM(S): at 05:32

## 2023-11-21 RX ADMIN — Medication 975 MILLIGRAM(S): at 12:29

## 2023-11-21 NOTE — PROGRESS NOTE ADULT - PROBLEM SELECTOR PLAN 2
anemia due to blood loss from c/sec- asymptomatic, not requiring transfusion  Fe, Vitamin C supplementation  Monitor for signs/symptoms of anemia
#Acute blood loss anemia  sp 1 unit PRBCs with appropriate rise in h/h  check am cbc
anemia due to blood loss from c/sec- asymptomatic, not requiring transfusion  Fe, Vitamin C supplementation  Monitor for signs/symptoms of anemia

## 2023-11-21 NOTE — DISCHARGE NOTE OB - CARE PROVIDER_API CALL
Jordon Sweet  Obstetrics and Gynecology  [address]  [place] [address]  Phone: [phone]  Fax: [phone]  Follow Up Time: 2 weeks

## 2023-11-21 NOTE — DISCHARGE NOTE OB - NS MD DC FALL RISK RISK
For information on Fall & Injury Prevention, visit: [email or web address] OR  [email or web address] OR  https://www.cdc.gov/steadi/patient.html

## 2023-11-21 NOTE — DISCHARGE NOTE OB - HOSPITAL COURSE
Patient had low transverse  section for failure of descent.  Please see operative note for details.  During postpartum course patient's vitals were stable, vaginal bleeding appropriate, and pain well controlled. Pt received 1uPRBC transfusion POD#1 with appropriate rise. Post operation day two hematocrit was appropriate.  On day of discharge patient was ambulating, her pain controlled with oral medications, had adequate oral intake, and was voiding freely.  Discharge instructions and precautions were given.  Will return to the office in 2 weeks for incision check.

## 2023-11-21 NOTE — PROGRESS NOTE ADULT - SUBJECTIVE AND OBJECTIVE BOX
Day _1__ of Anesthesia Pain Management Service    SUBJECTIVE:  Pain Scale Score	At rest: ___ 	With Activity: ___ 	[ x] Refer to charted pain scores    THERAPY:    s/p _____2__ mg PF morphine on ___11/18___      MEDICATIONS  (STANDING):  acetaminophen     Tablet .. 975 milliGRAM(s) Oral <User Schedule>  ascorbic acid 500 milliGRAM(s) Oral three times a day  diphtheria/tetanus/pertussis (acellular) Vaccine (Adacel) 0.5 milliLiter(s) IntraMuscular once  ferrous    sulfate 325 milliGRAM(s) Oral three times a day  heparin   Injectable 5000 Unit(s) SubCutaneous every 12 hours  ibuprofen  Tablet. 600 milliGRAM(s) Oral every 6 hours  ketorolac   Injectable 30 milliGRAM(s) IV Push every 6 hours  lactated ringers. 1000 milliLiter(s) (75 mL/Hr) IV Continuous <Continuous>  misoprostol Oral Solution 20 MICROGram(s) Oral every 2 hours  morphine PF Epidural 2 milliGRAM(s) Epidural once  oxytocin Infusion 333.333 milliUNIT(s)/Min (1000 mL/Hr) IV Continuous <Continuous>  oxytocin Infusion 333.333 milliUNIT(s)/Min (1000 mL/Hr) IV Continuous <Continuous>    MEDICATIONS  (PRN):  dexAMETHasone  Injectable 4 milliGRAM(s) IV Push every 6 hours PRN Nausea  diphenhydrAMINE 25 milliGRAM(s) Oral every 6 hours PRN Pruritus  lanolin Ointment 1 Application(s) Topical every 6 hours PRN Sore Nipples  magnesium hydroxide Suspension 30 milliLiter(s) Oral two times a day PRN Constipation  naloxone Injectable 0.1 milliGRAM(s) IV Push every 3 minutes PRN For ANY of the following changes in patient status:  A. Breaths Per Minute LESS THAN 10, B. Oxygen saturation LESS THAN 90%, C. Sedation score of 6 for Stop After: 4 Times  ondansetron Injectable 4 milliGRAM(s) IV Push every 6 hours PRN Nausea  oxyCODONE    IR 5 milliGRAM(s) Oral every 3 hours PRN Moderate to Severe Pain (4-10)  oxyCODONE    IR 5 milliGRAM(s) Oral once PRN Moderate to Severe Pain (4-10)  simethicone 80 milliGRAM(s) Chew every 4 hours PRN Gas      OBJECTIVE:    Sedation Score:	[x ] Alert	[ ] Drowsy	[ ] Arousable	[ ] Asleep	[ ] Unresponsive    Side Effects:	[x ] None	[ ] Nausea	[ ] Vomiting	[ ] Pruritus  		  [ ] Weakness		[ ] Numbness	[ ] Other:    Vital Signs Last 24 Hrs  T(C): 36.7 (19 Nov 2023 09:00), Max: 37.5 (18 Nov 2023 18:52)  T(F): 98 (19 Nov 2023 09:00), Max: 99.5 (18 Nov 2023 18:52)  HR: 78 (19 Nov 2023 06:19) (73 - 129)  BP: 111/76 (19 Nov 2023 06:19) (102/61 - 130/72)  BP(mean): 91 (19 Nov 2023 00:05) (78 - 95)  RR: 18 (19 Nov 2023 09:00) (16 - 18)  SpO2: 96% (19 Nov 2023 09:00) (88% - 100%)    Parameters below as of 19 Nov 2023 09:00  Patient On (Oxygen Delivery Method): room air        ASSESSMENT/ PLAN  [x ] Patient transitioned to prn analgesics  [ x] Pain management per primary service, pain service to sign off   [x ]Documentation and Verification of current medications     Comments:
Postpartum Note-  Section POD#2    Prenatal Labs  Blood type: O Positive  Rubella IgG: IMMune  RPR: Negative      S:Patient w/o complaints,  pain is controlled.  Pt OOB, passing flatus, tolerating PO and voiding.   Lochia WNL.   Denies SOB, dizziness, Cp, palpitations, H/A.     O:  Vital Signs Last 24 Hrs  T(C): 36.6 (2023 06:04), Max: 36.7 (2023 09:00)  T(F): 97.9 (2023 06:04), Max: 98 (2023 09:00)  HR: 86 (2023 06:04) (77 - 86)  BP: 105/70 (2023 06:04) (97/64 - 113/78)  BP(mean): --  RR: 18 (2023 06:04) (18 - 18)  SpO2: 97% (2023 06:04) (96% - 97%)    Parameters below as of 2023 06:04  Patient On (Oxygen Delivery Method): room air      Gen: NAD  Abdomen: Soft, appropriate tenderness, non-distended, fundus firm.  Incision: Clean/dry/ intact.  Neg erythema,  Neg ecchymosis .  Sub Q-dermabond     Lochia WNL  Ext: Soft/NT B/L,   Neg Edema    LABS:                     7.4    17.94 )-----------( 224      (  @ 12:44 )             23.6                8.2    20.76 )-----------( 212      (  @ 06:54 )             25.8                10.3   10.45 )-----------( 258      ( 18 @ 05:27 )             33.1             
Postpartum Note-  Section POD#1    Prenatal Labs  Blood type: O Positive  Rubella IgG: IMMune  RPR: Negative      S:Patient c/o nausea- improved w/ medication. Otherwise,  pain is controlled.  Pt not OOB. Denies passing flatus. Tolerating PO. Block in situ.   Lochia WNL.     O:  Vital Signs Last 24 Hrs  T(C): 36.5 (2023 06:19), Max: 37.5 (2023 18:52)  T(F): 97.7 (2023 06:19), Max: 99.5 (2023 18:52)  HR: 78 (2023 06:19) (72 - 129)  BP: 111/76 (2023 06:19) (102/61 - 130/72)  BP(mean): 91 (2023 00:05) (78 - 95)  RR: 18 (2023 06:19) (16 - 18)  SpO2: 94% (2023 06:19) (88% - 100%)    Parameters below as of 2023 06:19  Patient On (Oxygen Delivery Method): room air      I&O's Summary    2023 07:01  -  2023 07:00  --------------------------------------------------------  IN: 1000 mL / OUT: 3018 mL / NET: -2018 mL        Gen: NAD  Abdomen: Soft, appropriate tenderness, non-distended, fundus firm.  Incision: Clean/dry/ intact.  Neg erythema,  Neg ecchymosis .  Sub Q-dermabond     Lochia WNL  Ext: Soft/NT B/L,   Neg Edema    LABS:             8.2    20.76 )-----------( 212      (  @ 06:54 )             25.8                10.3   10.45 )-----------( 258      (  @ 05:27 )             33.1               
Postpartum Note-  Section POD#3    Prenatal Labs  Blood type: O Positive  Rubella IgG: Immune  RPR: Negative          S: Patient w/o complaints, pain is controlled. Denies dizziness or lightheadedness. Pt is OOB, tolerating PO, passing flatus, and voiding. Lochia WNL.     O:  Vital Signs Last 24 Hrs  T(C): 36.8 (2023 06:10), Max: 36.8 (2023 06:10)  T(F): 98.3 (2023 06:10), Max: 98.3 (2023 06:10)  HR: 79 (2023 06:10) (77 - 82)  BP: 126/84 (2023 06:10) (103/68 - 126/84)  BP(mean): --  RR: 18 (2023 06:10) (18 - 18)  SpO2: 98% (2023 06:10) (98% - 98%)    Parameters below as of 2023 06:10  Patient On (Oxygen Delivery Method): room air        Gen: NAD  Abdomen: Soft, nontender, non distended, fundus firm.  Incision: Clean/dry/ intact. no erythema, no ecchymosis.   SubQ     Lochia WNL  Ext: Neg calf tenderness    LABS:               8.8    12.50 )-----------( 262      (  @ 06:44 )             27.2                8.2    14.37 )-----------( 248      (  @ 18:30 )             25.2                6.9    14.09 )-----------( 219      (  @ 06:50 )             22.0                7.4    17.94 )-----------( 224      (  @ 12:44 )             23.6                8.2    20.76 )-----------( 212      (  @ 06:54 )             25.8

## 2023-11-21 NOTE — DISCHARGE NOTE OB - CARE PLAN
Principal Discharge DX:	 delivery delivered  Assessment and plan of treatment:	After discharge, please stay on pelvic rest for 6 weeks, meaning no sexual intercourse, no tampons and no douching.  No driving for 2 weeks as women can loose a lot of blood during delivery and there is a possibility of being lightheaded/fainting.  No lifting objects heavier than baby for two weeks.  Expect to have vaginal bleeding/spotting for up to six weeks.  The bleeding should get lighter and more white/light brown with time.  For bleeding soaking more than a pad an hour or passing clots greater than the size of your fist, come in to the emergency department.    Follow up in 2 weeks for incision check.  Call clinic for noticeable increase in redness or swelling at incision, discharge from incision, or opening of skin at incision site.   1

## 2023-11-21 NOTE — DISCHARGE NOTE OB - PLAN OF CARE
After discharge, please stay on pelvic rest for 6 weeks, meaning no sexual intercourse, no tampons and no douching.  No driving for 2 weeks as women can loose a lot of blood during delivery and there is a possibility of being lightheaded/fainting.  No lifting objects heavier than baby for two weeks.  Expect to have vaginal bleeding/spotting for up to six weeks.  The bleeding should get lighter and more white/light brown with time.  For bleeding soaking more than a pad an hour or passing clots greater than the size of your fist, come in to the emergency department.    Follow up in 2 weeks for incision check.  Call clinic for noticeable increase in redness or swelling at incision, discharge from incision, or opening of skin at incision site.

## 2023-11-21 NOTE — DISCHARGE NOTE OB - PATIENT PORTAL LINK FT
You can access the FollowMyHealth Patient Portal offered by [facility] by registering at the following website: [email or web address]. By joining [facility]’s FollowMyHealth portal, you will also be able to view your health information using other applications (apps) compatible with our system.

## 2023-11-21 NOTE — PROGRESS NOTE ADULT - PROBLEM SELECTOR PLAN 1
#Postpartum State  Increase OOB  PO Pain Protocol  Continue Regular Diet  Continue Routine Postop/Postpartum Care  Discharge Planning
Increase OOB  Pain protocol  DVT ppx  Regular diet  Block to be D/C'd  AM CBC  Routine Postpartum/Post-op care
Increase OOB  Pain protocol  DVT ppx  Regular diet  AM CBC  Routine Postpartum/Post-op care

## 2023-11-21 NOTE — PROGRESS NOTE ADULT - NS ATTEND AMEND GEN_ALL_CORE FT
Pt doing well today after blood tx. CBC rise appropriate. VSS. Patient feeling well, ambulating, tolerating PO and voiding. Plan for discharge today    I have personally seen, examined, and participated in the care of this patient. I have reviewed all pertinent clinical information, including history, physical exam, plan, and the Resident 's note and agree except as noted.     Jesús Maldonado MD

## 2023-11-21 NOTE — PROGRESS NOTE ADULT - ASSESSMENT
A/P:  27y  POD #2 S/P primary   section for arrest of descent at 10cm  Doing well    PMHx:  Current Issues:   #ABL anemia  anemia due to blood loss from c/sec- asymptomatic, not requiring transfusion  Fe, Vitamin C supplementation  Monitor for signs/symptoms of anemia   
A/P:  28y GP POD # 3 S/P primary  section for arrest of descent  Doing well
  A/P:  27y  POD # 1 S/P primary   section for arrest of descent at 10cm  Doing well    PMHx:  Current Issues:   #ABL anemia  anemia due to blood loss from c/sec- asymptomatic, not requiring transfusion  Fe, Vitamin C supplementation  Monitor for signs/symptoms of anemia